# Patient Record
Sex: MALE
[De-identification: names, ages, dates, MRNs, and addresses within clinical notes are randomized per-mention and may not be internally consistent; named-entity substitution may affect disease eponyms.]

---

## 2020-05-20 ENCOUNTER — APPOINTMENT (OUTPATIENT)
Dept: CARDIOLOGY | Facility: CLINIC | Age: 35
End: 2020-05-20

## 2020-05-20 PROBLEM — Z00.00 ENCOUNTER FOR PREVENTIVE HEALTH EXAMINATION: Status: ACTIVE | Noted: 2020-05-20

## 2023-03-22 ENCOUNTER — APPOINTMENT (OUTPATIENT)
Dept: PODIATRY | Facility: CLINIC | Age: 38
End: 2023-03-22

## 2023-05-02 ENCOUNTER — APPOINTMENT (OUTPATIENT)
Dept: PODIATRY | Facility: CLINIC | Age: 38
End: 2023-05-02

## 2024-10-31 ENCOUNTER — EMERGENCY (EMERGENCY)
Facility: HOSPITAL | Age: 39
LOS: 1 days | Discharge: HOME | End: 2024-10-31
Attending: STUDENT IN AN ORGANIZED HEALTH CARE EDUCATION/TRAINING PROGRAM | Admitting: STUDENT IN AN ORGANIZED HEALTH CARE EDUCATION/TRAINING PROGRAM
Payer: COMMERCIAL

## 2024-10-31 ENCOUNTER — EMERGENCY (EMERGENCY)
Facility: HOSPITAL | Age: 39
LOS: 1 days | Discharge: HOME | End: 2024-10-31
Admitting: STUDENT IN AN ORGANIZED HEALTH CARE EDUCATION/TRAINING PROGRAM
Payer: MEDICAID

## 2024-10-31 VITALS
SYSTOLIC BLOOD PRESSURE: 151 MMHG | HEIGHT: 72 IN | TEMPERATURE: 98 F | WEIGHT: 175.05 LBS | OXYGEN SATURATION: 98 % | DIASTOLIC BLOOD PRESSURE: 90 MMHG | RESPIRATION RATE: 18 BRPM | HEART RATE: 85 BPM

## 2024-10-31 VITALS
RESPIRATION RATE: 15 BRPM | DIASTOLIC BLOOD PRESSURE: 79 MMHG | OXYGEN SATURATION: 100 % | HEART RATE: 79 BPM | HEIGHT: 72 IN | TEMPERATURE: 98 F | SYSTOLIC BLOOD PRESSURE: 120 MMHG

## 2024-10-31 DIAGNOSIS — F29 UNSPECIFIED PSYCHOSIS NOT DUE TO A SUBSTANCE OR KNOWN PHYSIOLOGICAL CONDITION: ICD-10-CM

## 2024-10-31 DIAGNOSIS — F19.10 OTHER PSYCHOACTIVE SUBSTANCE ABUSE, UNCOMPLICATED: ICD-10-CM

## 2024-10-31 LAB
ADD ON TEST-SPECIMEN IN LAB: SIGNIFICANT CHANGE UP
ALBUMIN SERPL ELPH-MCNC: 4.1 G/DL — SIGNIFICANT CHANGE UP (ref 3.3–5)
ALP SERPL-CCNC: 77 U/L — SIGNIFICANT CHANGE UP (ref 40–120)
ALT FLD-CCNC: 18 U/L — SIGNIFICANT CHANGE UP (ref 4–41)
AMPHET UR-MCNC: NEGATIVE — SIGNIFICANT CHANGE UP
ANION GAP SERPL CALC-SCNC: 15 MMOL/L — HIGH (ref 7–14)
APAP SERPL-MCNC: <10 UG/ML — LOW (ref 15–25)
APPEARANCE UR: ABNORMAL
AST SERPL-CCNC: 16 U/L — SIGNIFICANT CHANGE UP (ref 4–40)
BACTERIA # UR AUTO: NEGATIVE /HPF — SIGNIFICANT CHANGE UP
BARBITURATES UR SCN-MCNC: NEGATIVE — SIGNIFICANT CHANGE UP
BASOPHILS # BLD AUTO: 0.03 K/UL — SIGNIFICANT CHANGE UP (ref 0–0.2)
BASOPHILS NFR BLD AUTO: 0.4 % — SIGNIFICANT CHANGE UP (ref 0–2)
BENZODIAZ UR-MCNC: POSITIVE
BILIRUB SERPL-MCNC: 0.8 MG/DL — SIGNIFICANT CHANGE UP (ref 0.2–1.2)
BILIRUB UR-MCNC: NEGATIVE — SIGNIFICANT CHANGE UP
BUN SERPL-MCNC: 15 MG/DL — SIGNIFICANT CHANGE UP (ref 7–23)
CALCIUM SERPL-MCNC: 9.3 MG/DL — SIGNIFICANT CHANGE UP (ref 8.4–10.5)
CAST: 4 /LPF — SIGNIFICANT CHANGE UP (ref 0–4)
CHLORIDE SERPL-SCNC: 103 MMOL/L — SIGNIFICANT CHANGE UP (ref 98–107)
CO2 SERPL-SCNC: 22 MMOL/L — SIGNIFICANT CHANGE UP (ref 22–31)
COCAINE METAB.OTHER UR-MCNC: POSITIVE
COLOR SPEC: YELLOW — SIGNIFICANT CHANGE UP
CREAT SERPL-MCNC: 1.17 MG/DL — SIGNIFICANT CHANGE UP (ref 0.5–1.3)
CREATININE URINE RESULT, DAU: 279 MG/DL — SIGNIFICANT CHANGE UP
DIFF PNL FLD: NEGATIVE — SIGNIFICANT CHANGE UP
EGFR: 81 ML/MIN/1.73M2 — SIGNIFICANT CHANGE UP
EOSINOPHIL # BLD AUTO: 0.07 K/UL — SIGNIFICANT CHANGE UP (ref 0–0.5)
EOSINOPHIL NFR BLD AUTO: 0.9 % — SIGNIFICANT CHANGE UP (ref 0–6)
ETHANOL SERPL-MCNC: <10 MG/DL — SIGNIFICANT CHANGE UP
FENTANYL UR QL SCN: NEGATIVE — SIGNIFICANT CHANGE UP
GLUCOSE SERPL-MCNC: 112 MG/DL — HIGH (ref 70–99)
GLUCOSE UR QL: NEGATIVE MG/DL — SIGNIFICANT CHANGE UP
HCT VFR BLD CALC: 39.2 % — SIGNIFICANT CHANGE UP (ref 39–50)
HGB BLD-MCNC: 12.9 G/DL — LOW (ref 13–17)
IANC: 5.63 K/UL — SIGNIFICANT CHANGE UP (ref 1.8–7.4)
IMM GRANULOCYTES NFR BLD AUTO: 0.3 % — SIGNIFICANT CHANGE UP (ref 0–0.9)
KETONES UR-MCNC: ABNORMAL MG/DL
LEUKOCYTE ESTERASE UR-ACNC: NEGATIVE — SIGNIFICANT CHANGE UP
LYMPHOCYTES # BLD AUTO: 1.63 K/UL — SIGNIFICANT CHANGE UP (ref 1–3.3)
LYMPHOCYTES # BLD AUTO: 20.5 % — SIGNIFICANT CHANGE UP (ref 13–44)
MCHC RBC-ENTMCNC: 29.1 PG — SIGNIFICANT CHANGE UP (ref 27–34)
MCHC RBC-ENTMCNC: 32.9 G/DL — SIGNIFICANT CHANGE UP (ref 32–36)
MCV RBC AUTO: 88.5 FL — SIGNIFICANT CHANGE UP (ref 80–100)
METHADONE UR-MCNC: POSITIVE
MONOCYTES # BLD AUTO: 0.58 K/UL — SIGNIFICANT CHANGE UP (ref 0–0.9)
MONOCYTES NFR BLD AUTO: 7.3 % — SIGNIFICANT CHANGE UP (ref 2–14)
NEUTROPHILS # BLD AUTO: 5.63 K/UL — SIGNIFICANT CHANGE UP (ref 1.8–7.4)
NEUTROPHILS NFR BLD AUTO: 70.6 % — SIGNIFICANT CHANGE UP (ref 43–77)
NITRITE UR-MCNC: NEGATIVE — SIGNIFICANT CHANGE UP
NRBC # BLD: 0 /100 WBCS — SIGNIFICANT CHANGE UP (ref 0–0)
NRBC # FLD: 0 K/UL — SIGNIFICANT CHANGE UP (ref 0–0)
OPIATES UR-MCNC: NEGATIVE — SIGNIFICANT CHANGE UP
OXYCODONE UR-MCNC: POSITIVE
PCP SPEC-MCNC: SIGNIFICANT CHANGE UP
PCP UR-MCNC: NEGATIVE — SIGNIFICANT CHANGE UP
PH UR: 6 — SIGNIFICANT CHANGE UP (ref 5–8)
PLATELET # BLD AUTO: 210 K/UL — SIGNIFICANT CHANGE UP (ref 150–400)
POTASSIUM SERPL-MCNC: 3.3 MMOL/L — LOW (ref 3.5–5.3)
POTASSIUM SERPL-SCNC: 3.3 MMOL/L — LOW (ref 3.5–5.3)
PROT SERPL-MCNC: 7.6 G/DL — SIGNIFICANT CHANGE UP (ref 6–8.3)
PROT UR-MCNC: 30 MG/DL
RBC # BLD: 4.43 M/UL — SIGNIFICANT CHANGE UP (ref 4.2–5.8)
RBC # FLD: 13 % — SIGNIFICANT CHANGE UP (ref 10.3–14.5)
RBC CASTS # UR COMP ASSIST: 1 /HPF — SIGNIFICANT CHANGE UP (ref 0–4)
REVIEW: SIGNIFICANT CHANGE UP
SALICYLATES SERPL-MCNC: <0.3 MG/DL — LOW (ref 15–30)
SARS-COV-2 RNA SPEC QL NAA+PROBE: SIGNIFICANT CHANGE UP
SODIUM SERPL-SCNC: 140 MMOL/L — SIGNIFICANT CHANGE UP (ref 135–145)
SP GR SPEC: 1.02 — SIGNIFICANT CHANGE UP (ref 1–1.03)
SQUAMOUS # UR AUTO: 1 /HPF — SIGNIFICANT CHANGE UP (ref 0–5)
THC UR QL: NEGATIVE — SIGNIFICANT CHANGE UP
TOXICOLOGY SCREEN, DRUGS OF ABUSE, SERUM RESULT: SIGNIFICANT CHANGE UP
TSH SERPL-MCNC: <0.1 UIU/ML — LOW (ref 0.27–4.2)
UROBILINOGEN FLD QL: 1 MG/DL — SIGNIFICANT CHANGE UP (ref 0.2–1)
WBC # BLD: 7.96 K/UL — SIGNIFICANT CHANGE UP (ref 3.8–10.5)
WBC # FLD AUTO: 7.96 K/UL — SIGNIFICANT CHANGE UP (ref 3.8–10.5)
WBC UR QL: 1 /HPF — SIGNIFICANT CHANGE UP (ref 0–5)

## 2024-10-31 PROCEDURE — 90792 PSYCH DIAG EVAL W/MED SRVCS: CPT

## 2024-10-31 PROCEDURE — 70450 CT HEAD/BRAIN W/O DYE: CPT | Mod: 26,MC

## 2024-10-31 PROCEDURE — 99284 EMERGENCY DEPT VISIT MOD MDM: CPT

## 2024-10-31 PROCEDURE — 99285 EMERGENCY DEPT VISIT HI MDM: CPT

## 2024-10-31 PROCEDURE — 99053 MED SERV 10PM-8AM 24 HR FAC: CPT

## 2024-10-31 RX ORDER — HALOPERIDOL DECANOATE 50 MG/ML
5 INJECTION INTRAMUSCULAR ONCE
Refills: 0 | Status: COMPLETED | OUTPATIENT
Start: 2024-10-31 | End: 2024-10-31

## 2024-10-31 RX ORDER — HYDROXYZINE HCL 25 MG
50 TABLET ORAL ONCE
Refills: 0 | Status: DISCONTINUED | OUTPATIENT
Start: 2024-10-31 | End: 2024-10-31

## 2024-10-31 RX ORDER — LORAZEPAM 2 MG
2 TABLET ORAL ONCE
Refills: 0 | Status: DISCONTINUED | OUTPATIENT
Start: 2024-10-31 | End: 2024-10-31

## 2024-10-31 RX ADMIN — HALOPERIDOL DECANOATE 5 MILLIGRAM(S): 50 INJECTION INTRAMUSCULAR at 17:33

## 2024-10-31 RX ADMIN — Medication 2 MILLIGRAM(S): at 17:33

## 2024-10-31 NOTE — ED BEHAVIORAL HEALTH ASSESSMENT NOTE - DIFFERENTIAL
unlikely this presentation is due to a primary psychotic disorder  given abruptness of current presentation, will need to consider organicity vs ill effects of substance use (current tox screen yielding + BZD, + cocaine, + methadone, + oxycodone)
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
## 2024-10-31 NOTE — ED BEHAVIORAL HEALTH ASSESSMENT NOTE - NSBHSACOC_PSY_A_CORE FT
reported past hx of cocaine use: whilst he denied any recent use, per TOX screen: currently + for cocaine

## 2024-10-31 NOTE — ED PROVIDER NOTE - PATIENT PORTAL LINK FT
You can access the FollowMyHealth Patient Portal offered by [facility] by registering at the following website: [email or web address]. By joining [facility]’s FollowMyHealth portal, you will also be able to view your health information using other applications (apps) compatible with our system.

## 2024-10-31 NOTE — ED BEHAVIORAL HEALTH ASSESSMENT NOTE - RISK ASSESSMENT
Risk Assessment:  Modifiable risk factors: psychosis, illicit substance use  Unmodifiable risk factors: young male, past hx of DOMINGO, with reported hx of cocaine use  Protective factors: currently NO ACTIVE OBJECTIVE findings of acute suicidality, no reported hx of SA nor any NSSIB, no family hx of SA, Pt's sense of responsibility to family, positive therapeutic relationship with family, social supports, no reported access to guns, no complex medical issues nor any chronic pains, Orthodoxy, currently not severely depressed, is not acutely manic, no pending legal issues, employed, domiciled     Given above, the Pt is currently NOT in acute imminent risk of self harm and is also NOT AT CHRONICALLY elevated risk of self-harm.  currently, there is no identifiable acute increase in risk that   would be mitigated by an involuntary psychiatric admission. Pt refused voluntary admission to in-Pt unit

## 2024-10-31 NOTE — ED BEHAVIORAL HEALTH ASSESSMENT NOTE - NSSUICPROTFACT_PSY_ALL_CORE
Responsibility to children, family, or others/Identifies reasons for living/Supportive social network of family or friends/Fear of death or the actual act of killing self/Engaged in work or school/Positive therapeutic relationships/Sabianism beliefs

## 2024-10-31 NOTE — ED BEHAVIORAL HEALTH ASSESSMENT NOTE - OTHER PAST PSYCHIATRIC HISTORY (INCLUDE DETAILS REGARDING ONSET, COURSE OF ILLNESS, INPATIENT/OUTPATIENT TREATMENT)
no reported hx of psych admissions  denied any hx of SA  no current behavioral health providers  claims that he did see a therapist after his parents  (during his childhood)

## 2024-10-31 NOTE — ED PROVIDER NOTE - PATIENT PORTAL LINK FT
none
You can access the FollowMyHealth Patient Portal offered by [facility] by registering at the following website: [email or web address]. By joining [facility]’s FollowMyHealth portal, you will also be able to view your health information using other applications (apps) compatible with our system.
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
## 2024-10-31 NOTE — ED BEHAVIORAL HEALTH ASSESSMENT NOTE - SAFETY PLAN ADDT'L DETAILS
Safety plan discussed with.../Education provided regarding environmental safety / lethal means restriction/Provision of National Suicide Prevention Lifeline 4-157-945-OZSU (6075)

## 2024-10-31 NOTE — ED BEHAVIORAL HEALTH ASSESSMENT NOTE - NSBHSAOPI_PSY_A_CORE FT
he denied abusing oxy; per TOX screen: currently + for oxycodone/ methadone. Pt reports he is using oxycodone PRN - had previous prescription given to him during his post surgery for a right tibial/ fibular fracture

## 2024-10-31 NOTE — ED ADULT TRIAGE NOTE - CHIEF COMPLAINT QUOTE
pt here for increased anxiety, paranoia and insomnia for past 4 days, pt denies any SI or HI, pt believes  he has been followed by bunch of guys

## 2024-10-31 NOTE — ED BEHAVIORAL HEALTH NOTE - BEHAVIORAL HEALTH NOTE
[facility]   Reference #: [id number] - no controlled substances prescribed      COLUMBA GOMEZ   Medicaid ID [id number]    1985 (39 Yrs)   Address [address], [place] [address]    Behavioral Health Diagnoses includes Generalized Anxiety Disorder * Other psychoactive substance related disorders * Tobacco related disorder *     Medical Diagnoses includes  Other functional intestinal disorders *  Disorders of lipoprotein metabolism and other lipidemias *    Care Coordination  No Medicaid claims found for this data type    Medication: Controlled Substance  IV Anxiolytic/Hypnotic Zolpidem Tartrate 10 MG, last picked up on 2023    Behavioral Health Services  BRIEN KELLY MD, 5/13/2023 x 1 visit for Generalized anxiety disorder   - Office O/P Est Low 20 Min    Hospital/ER Services  No Medicaid claims found for this data type      [facility] UNIFIED COURT SYSTEM/ [employer] SITE - NO PENDING LEGAL ISSUES/ COURT DATES LISTED

## 2024-10-31 NOTE — ED BEHAVIORAL HEALTH NOTE - BEHAVIORAL HEALTH NOTE
As per provider, worker called pt’s wife Jayda ([phone]) for collateral information. All information is as per Jayda:     The patient is a 39-year-old male with no history of psychiatric hospitalization or concerns, aside from situational depression related to his father's sudden passing two years ago. While he has used drugs and alcohol in the past, including cocaine, he has reportedly abstained for a significant period, though he still drinks alcohol occasionally. He lives with his wife and two children, aged 20 and 11, with no safety concerns noted for the children. 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    He has been experiencing sleep disturbances for the past four days but maintains a normal appetite and hygiene routine. His wife has noticed a concerning change in his behavior, characterized by extreme paranoia. He called EMS due to these concerns, which included believing people were trying to steal his cars and expressing suspicion towards his new neighbors.    Over the past two days, his paranoia has escalated. He has been unable to sleep for the past 4 days, fixated on security cameras, and convinced that people are trying to break into their home. He has not expressed any suicidal or homicidal ideation, but his behavior has become increasingly erratic. He has been pacing, yelling at unseen individuals, and running outside to confront perceived threats, claiming to see shadows and hear voices. While he has not exhibited violence or aggression, his panicked state and insistence on protecting his wife have caused her significant anxiety.    He has a history of back surgery and rotator cuff repair, but no other medical issues. There is no known family history of mental illness, although his wife experiences depression. There are no legal issues. His wife believes he requires professional help to address these concerning behaviors, recognizing his chronic stress and anxiety as potential contributing factors. case discussed with psychiatry.

## 2024-10-31 NOTE — ED PROVIDER NOTE - PROGRESS NOTE DETAILS
Reassessed pt: arousable, ambulatory, po challenge tolerated. will call wife to  pt and transport home.
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
## 2024-10-31 NOTE — ED ADULT NURSE NOTE - NSFALLUNIVINTERV_ED_ALL_ED
Bed/Stretcher in lowest position, wheels locked, appropriate side rails in place/Call bell, personal items and telephone in reach/Instruct patient to call for assistance before getting out of bed/chair/stretcher/Non-slip footwear applied when patient is off stretcher/[place] to call system/Physically safe environment - no spills, clutter or unnecessary equipment/Purposeful proactive rounding/Room/bathroom lighting operational, light cord in reach

## 2024-10-31 NOTE — ED BEHAVIORAL HEALTH ASSESSMENT NOTE - DETAILS
no reported hx of SA nor engaged in any NSSIB daughter, son strong hx of colon, liver cancer - paternal; denied any illicit substance use HE IS NOT SUICIDAL discussed with MAXIMILIAN Main. spouse updated

## 2024-10-31 NOTE — ED PROVIDER NOTE - NSFOLLOWUPINSTRUCTIONS_ED_ALL_ED_FT
Follow up with your PMD within 48-72 hrs. Show copies of your reports given to you.   Worsening, continued or new concerning symptoms return to the emergency department.    You have been given information necessary to follow up with the  [facility] ([facility]) Crisis center & other outpatient  psychiatric clinics within your community    • [facility] walk in Crisis centre  [address] [place] [address]  [phone] [email or web address]  Hours of operation:  Day	                                        Hours  Sunday                                  Closed  Monday                                9am - 3pm  Tuesday                                9am - 3pm  Wednesday                          9am - 3pm  Thursday                               9am - 3pm  Friday                                    9am - 3pm  Saturday                                Closed

## 2024-10-31 NOTE — ED PROVIDER NOTE - NS ED ROS FT
Patient not interacting or answering questions. Attempted to ask questions and examine patient which he responded to by pulling away and covering himself with blankets.

## 2024-10-31 NOTE — ED BEHAVIORAL HEALTH ASSESSMENT NOTE - REFERRAL / APPOINTMENT DETAILS
provided with various mental health clinic resources including substance abuse clinics. discussed impact of continued use of BZD and opioid abuse and risks for withdrawal including potential life threatening withdrawal from continued BZD use.
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
## 2024-10-31 NOTE — ED BEHAVIORAL HEALTH ASSESSMENT NOTE - AXIS III
low TSH, CTH findings noted: Mild diffuse prominence of the ventricular system - normal vs compensated possible congenital hydrocephalus;  No acute  intracranial hemorrhage, or midline shift.

## 2024-10-31 NOTE — ED BEHAVIORAL HEALTH ASSESSMENT NOTE - SUMMARY
39/M with hx of DOMINGO as per psyckes; has no psych admissions; denied any SA and past hx of cocaine use - currently, he denied using any illicit substance use including alcohol but per TOX screen - Pt is + for the following: BZD, methadone, oxycodone and cocaine.   today, presented to the ED BIB EMS activated by self due to psychotic symptoms presenting with paranoia + increasingly getting more anxious and experiencing sleep  disturbances for the past 3 days    at  this time, presents with illogicality in TP; is anxious/ paranoid; experiencing VH; with poor insight.  however, given the abruptness of current psychotic symptoms, will not entertain for a primary undiagnosed psychotic disorder; rather will need to explore on organic cause(s) to attribute ongoing symptoms.      whilst he does present with paranoia and VH + illogical TP, the Pt's presentation is not consistent with a primary psychotic disorder like SCZ.  he is NOT DISORGANIZED IN THOUGHT PROCESS; NO DISORGANIZATION IN SPEECH; IS NOT ACTIVELY INTERNALLY STIMULATED; NO NEGATIVISM ELICITED.  no schneiderian first rank symptoms elicited.. prior to 3 days back, reportedly is a fully functional individual.  paranoia and VH are not attributed to a formal thought disorder given abruptness of presentation.     currently, not manifesting any signs/ symptoms suggestive of severe MDD; no severe specific anxiety disorder symptoms; is not acutely manic nor floridly psychotic.  is not passively or actively suicidal or homicidal. not catatonic.  at this time, no justification to pursue involuntary psych admission for this Pt as he does not meet criteria for such. offered voluntary admission for the purpose of med mgt +/- therapy for which he refused (granting that he is medically cleared).  as such, if Pt is later on medically cleared, will provide community resources. 39/M with hx of DOMINGO as per psyckes; has no psych admissions; denied any SA and past hx of cocaine use - currently, he denied using any illicit substance use including alcohol but per TOX screen - Pt is + for the following: BZD, methadone, oxycodone and cocaine.   today, presented to the ED BIB EMS activated by self due to psychotic symptoms presenting with paranoia + increasingly getting more anxious and experiencing sleep  disturbances for the past 3 days    at  this time, presents with illogicality in TP; is anxious/ paranoid; experiencing VH; with poor insight.  however, given the abruptness of current psychotic symptoms, will not entertain for a primary undiagnosed psychotic disorder; rather will need to explore on organic cause(s) to attribute ongoing symptoms.      whilst he does present with paranoia and VH + illogical TP, the Pt's presentation is not consistent with a primary psychotic disorder like SCZ.  he is NOT DISORGANIZED IN THOUGHT PROCESS; NO DISORGANIZATION IN SPEECH; IS NOT ACTIVELY INTERNALLY STIMULATED; NO NEGATIVISM ELICITED.  no schneiderian first rank symptoms elicited.. prior to 3 days back, reportedly is a fully functional individual.  paranoia and VH are not attributed to a formal thought disorder given abruptness of presentation.     currently, not manifesting any signs/ symptoms suggestive of severe MDD; no severe specific anxiety disorder symptoms; is not acutely manic nor floridly psychotic.  is not passively or actively suicidal or homicidal. not catatonic.  at this time, no justification to pursue involuntary psych admission for this Pt as he does not meet criteria for such. offered voluntary admission for the purpose of med mgt +/- therapy for which he refused (granting that he is medically cleared).  as such, if Pt is later on medically cleared, will provide community resources.    RECOMMENDATIONS:   1. neuro consult re: the [facility] findings. if Pt is medically cleared, will give referrals for psych clinic. if admitted to medicine, our Psych CL team will follow   1. Psychoeducation provided.  Encouraged follow up with OP psych services.  No indication for emergent psych meds at this time. Discussed role of psychotherapy.  discussed impact of illicit substance us on health and well being as well as the importance of sobriety. refusing detox/ rehab placement  2. Emergency protocol reviewed.  adviced to call 911 or come to the nearest ED should symptoms worsen; have increasing bouts of agitation/aggressive behavior; having SI/HI; or call [phone]    3. given resources to the community like walk in Crisis centre, [facility], other OP psych clinics within Pt's community  4. no psych meds prescribed 39/M with hx of DOMINGO as per psyckes; has no psych admissions; denied any SA and past hx of cocaine use - currently, he denied using any illicit substance use including alcohol but per TOX screen - Pt is + for the following: BZD, methadone, oxycodone and cocaine.   today, presented to the ED BIB EMS activated by self due to psychotic symptoms presenting with paranoia + increasingly getting more anxious and experiencing sleep  disturbances for the past 3 days    at  this time, presents with illogicality in TP; is anxious/ paranoid; experiencing VH; with poor insight.  however, given the abruptness of current psychotic symptoms, will not entertain for a primary undiagnosed psychotic disorder; rather will need to explore on organic cause(s) to attribute ongoing symptoms.      whilst he does present with paranoia and VH + illogical TP, the Pt's presentation is not consistent with a primary psychotic disorder like SCZ.  he is NOT DISORGANIZED IN THOUGHT PROCESS; NO DISORGANIZATION IN SPEECH; IS NOT ACTIVELY INTERNALLY STIMULATED; NO NEGATIVISM ELICITED.  no schneiderian first rank symptoms elicited.. prior to 3 days back, reportedly is a fully functional individual.  paranoia and VH are not attributed to a formal thought disorder given abruptness of presentation.     currently, not manifesting any signs/ symptoms suggestive of severe MDD; no severe specific anxiety disorder symptoms; is not acutely manic nor floridly psychotic.  is not passively or actively suicidal or homicidal. not catatonic.  at this time, no justification to pursue involuntary psych admission for this Pt as he does not meet criteria for such. offered voluntary admission for the purpose of med mgt +/- therapy for which he refused (granting that he is medically cleared).  as such, if Pt is later on medically cleared, will provide community resources.    RECOMMENDATIONS:   1. neuro consult re: the [facility] findings. if Pt is medically cleared, will give referrals for psych clinic. if admitted to medicine, our Psych CL team will follow   1. Psychoeducation provided.  Encouraged follow up with OP psych services.  No indication for emergent psych meds at this time. Discussed role of psychotherapy.  discussed impact of illicit substance us on health and well being as well as the importance of sobriety. refusing detox/ rehab placement  2. Emergency protocol reviewed.  adviced to call 911 or come to the nearest ED should symptoms worsen; have increasing bouts of agitation/aggressive behavior; having SI/HI; or call [phone]    3. given resources to the community like walk in Crisis centre, [facility] clinic, other OP psych clinics within Pt's community  4. no psych meds prescribed  5. adviced to consult PCP re: thyroid condition (TSH yielding low)

## 2024-10-31 NOTE — ED PROVIDER NOTE - CLINICAL SUMMARY MEDICAL DECISION MAKING FREE TEXT BOX
40 yo M no PMH p/w increased paranoia x4 days ago. Patient is fixated on VH of people trying to break into his car. Patient reports he takes multiple videos of people attempting to break in but unable to see images when presented to staff. Patient reports no one else sees these people, not even his wife. Patient returned from [facility] x1 week ago. 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 Reports he constantly calls the  but they never do anything. Denies fever, headache, dizziness, SI/HI/AH/VH, chills, chest pain, shortness of breath, abdominal pain, sick contact or recent travel. Denies alcohol use or other drugs.   Haldol, Ativan  Labs, EKG, COVID  CT Head  Dispo with neurology followup

## 2024-10-31 NOTE — ED PROVIDER NOTE - PHYSICAL EXAMINATION
Vital signs: Reviewed.   General: Laying with blankets over head not answering questions and pulling away when attempted to examine.  Cardiovascular: Regular rate and rhythm, no murmurs rubs or gallops were appreciated. No LE edema.  Lungs: Normal rate, rhythm and depth of respirations; no accessory muscle use; no wheezes, rales, or rhonchi, and no evidence of airway compromise  Abdomen: Soft, nondistended  Neuro:  moving all 4 extremities, CN2-12 grossly intact, no focal neurologic deficits  Derm: w/d/i  Psych: patient attempted to urinate in bed during evaluation

## 2024-11-01 VITALS
OXYGEN SATURATION: 99 % | RESPIRATION RATE: 17 BRPM | TEMPERATURE: 98 F | DIASTOLIC BLOOD PRESSURE: 63 MMHG | HEART RATE: 69 BPM | SYSTOLIC BLOOD PRESSURE: 115 MMHG

## 2024-11-01 LAB
ALBUMIN SERPL ELPH-MCNC: 4.2 G/DL — SIGNIFICANT CHANGE UP (ref 3.3–5)
ALP SERPL-CCNC: 80 U/L — SIGNIFICANT CHANGE UP (ref 40–120)
ALT FLD-CCNC: 19 U/L — SIGNIFICANT CHANGE UP (ref 4–41)
ANION GAP SERPL CALC-SCNC: 17 MMOL/L — HIGH (ref 7–14)
APAP SERPL-MCNC: <10 UG/ML — LOW (ref 15–25)
AST SERPL-CCNC: 23 U/L — SIGNIFICANT CHANGE UP (ref 4–40)
BASOPHILS # BLD AUTO: 0.04 K/UL — SIGNIFICANT CHANGE UP (ref 0–0.2)
BASOPHILS NFR BLD AUTO: 0.5 % — SIGNIFICANT CHANGE UP (ref 0–2)
BILIRUB SERPL-MCNC: 1 MG/DL — SIGNIFICANT CHANGE UP (ref 0.2–1.2)
BUN SERPL-MCNC: 11 MG/DL — SIGNIFICANT CHANGE UP (ref 7–23)
CALCIUM SERPL-MCNC: 9.4 MG/DL — SIGNIFICANT CHANGE UP (ref 8.4–10.5)
CHLORIDE SERPL-SCNC: 103 MMOL/L — SIGNIFICANT CHANGE UP (ref 98–107)
CO2 SERPL-SCNC: 21 MMOL/L — LOW (ref 22–31)
CREAT SERPL-MCNC: 0.98 MG/DL — SIGNIFICANT CHANGE UP (ref 0.5–1.3)
EGFR: 101 ML/MIN/1.73M2 — SIGNIFICANT CHANGE UP
EOSINOPHIL # BLD AUTO: 0.12 K/UL — SIGNIFICANT CHANGE UP (ref 0–0.5)
EOSINOPHIL NFR BLD AUTO: 1.6 % — SIGNIFICANT CHANGE UP (ref 0–6)
ETHANOL SERPL-MCNC: <10 MG/DL — SIGNIFICANT CHANGE UP
GLUCOSE SERPL-MCNC: 88 MG/DL — SIGNIFICANT CHANGE UP (ref 70–99)
HCT VFR BLD CALC: 40.6 % — SIGNIFICANT CHANGE UP (ref 39–50)
HGB BLD-MCNC: 13.3 G/DL — SIGNIFICANT CHANGE UP (ref 13–17)
IANC: 4.41 K/UL — SIGNIFICANT CHANGE UP (ref 1.8–7.4)
IMM GRANULOCYTES NFR BLD AUTO: 0.1 % — SIGNIFICANT CHANGE UP (ref 0–0.9)
LYMPHOCYTES # BLD AUTO: 2.39 K/UL — SIGNIFICANT CHANGE UP (ref 1–3.3)
LYMPHOCYTES # BLD AUTO: 31.9 % — SIGNIFICANT CHANGE UP (ref 13–44)
MCHC RBC-ENTMCNC: 29 PG — SIGNIFICANT CHANGE UP (ref 27–34)
MCHC RBC-ENTMCNC: 32.8 G/DL — SIGNIFICANT CHANGE UP (ref 32–36)
MCV RBC AUTO: 88.5 FL — SIGNIFICANT CHANGE UP (ref 80–100)
MONOCYTES # BLD AUTO: 0.52 K/UL — SIGNIFICANT CHANGE UP (ref 0–0.9)
MONOCYTES NFR BLD AUTO: 6.9 % — SIGNIFICANT CHANGE UP (ref 2–14)
NEUTROPHILS # BLD AUTO: 4.41 K/UL — SIGNIFICANT CHANGE UP (ref 1.8–7.4)
NEUTROPHILS NFR BLD AUTO: 59 % — SIGNIFICANT CHANGE UP (ref 43–77)
NRBC # BLD: 0 /100 WBCS — SIGNIFICANT CHANGE UP (ref 0–0)
NRBC # FLD: 0 K/UL — SIGNIFICANT CHANGE UP (ref 0–0)
PLATELET # BLD AUTO: 208 K/UL — SIGNIFICANT CHANGE UP (ref 150–400)
POTASSIUM SERPL-MCNC: 3.7 MMOL/L — SIGNIFICANT CHANGE UP (ref 3.5–5.3)
POTASSIUM SERPL-SCNC: 3.7 MMOL/L — SIGNIFICANT CHANGE UP (ref 3.5–5.3)
PROT SERPL-MCNC: 7.7 G/DL — SIGNIFICANT CHANGE UP (ref 6–8.3)
RBC # BLD: 4.59 M/UL — SIGNIFICANT CHANGE UP (ref 4.2–5.8)
RBC # FLD: 13.1 % — SIGNIFICANT CHANGE UP (ref 10.3–14.5)
SALICYLATES SERPL-MCNC: <0.3 MG/DL — LOW (ref 15–30)
SODIUM SERPL-SCNC: 141 MMOL/L — SIGNIFICANT CHANGE UP (ref 135–145)
TOXICOLOGY SCREEN, DRUGS OF ABUSE, SERUM RESULT: SIGNIFICANT CHANGE UP
WBC # BLD: 7.49 K/UL — SIGNIFICANT CHANGE UP (ref 3.8–10.5)
WBC # FLD AUTO: 7.49 K/UL — SIGNIFICANT CHANGE UP (ref 3.8–10.5)

## 2024-11-01 PROCEDURE — 70450 CT HEAD/BRAIN W/O DYE: CPT | Mod: 26,MC

## 2024-11-01 RX ORDER — LORAZEPAM 2 MG
1 TABLET ORAL ONCE
Refills: 0 | Status: DISCONTINUED | OUTPATIENT
Start: 2024-11-01 | End: 2024-11-01

## 2024-11-01 RX ORDER — HALOPERIDOL DECANOATE 50 MG/ML
5 INJECTION INTRAMUSCULAR ONCE
Refills: 0 | Status: COMPLETED | OUTPATIENT
Start: 2024-11-01 | End: 2024-11-01

## 2024-11-01 RX ADMIN — HALOPERIDOL DECANOATE 5 MILLIGRAM(S): 50 INJECTION INTRAMUSCULAR at 00:30

## 2024-11-01 RX ADMIN — Medication 1 MILLIGRAM(S): at 03:03

## 2024-11-01 NOTE — ED ADULT NURSE REASSESSMENT NOTE - NS ED NURSE REASSESS COMMENT FT1
Break coverage RN: Pt sleeping at this time. Respirations even and unlabored. Pt well appearing. No acute distress noted. bed in lowest, safety maintained. Pending results.
Pt lying in stretcher, pt nods yes when asked if he is feeling okay. Pt nods no when asked if there is anything he needs. Pt doesn't open eyes or verbally respond to questions. Respirations are even and unlabored, IV is patent and intact. Vitals as charted. Bed in lowest position, safety maintained.
Pt lying in stretcher, pt still not answering mentation questions or following commands. Respirations are even and unlabored, chest rise is equal bilaterally. Pt curled up into ball, unable to straighten legs or lay him flat on his back for CT scan. Pt uncooperative. Able to establish 22G IV in right hand for labs. As per MD Rao, medicated pt so CT can be obtained. Pt refusing vital signs. MD Rao made aware. Bed in lowest position, comfort measures provided, safety maintained.
Pt undressed and belongings sent ot security.
Break RN Note- Patient noncooperative with assessment or labs being obtained. Patient resting in bed, breathing even and nonlabored. No acute distress. Patient appears comfortable. Patient unable to be redirected. Patient to be medicated as ordered. Safety maintained. Patient stable upon exiting  the room.